# Patient Record
Sex: FEMALE | Race: WHITE | NOT HISPANIC OR LATINO | Employment: FULL TIME | URBAN - METROPOLITAN AREA
[De-identification: names, ages, dates, MRNs, and addresses within clinical notes are randomized per-mention and may not be internally consistent; named-entity substitution may affect disease eponyms.]

---

## 2019-09-08 ENCOUNTER — OFFICE VISIT (OUTPATIENT)
Dept: URGENT CARE | Facility: URGENT CARE | Age: 24
End: 2019-09-08
Payer: COMMERCIAL

## 2019-09-08 VITALS
TEMPERATURE: 98.4 F | RESPIRATION RATE: 18 BRPM | HEART RATE: 95 BPM | OXYGEN SATURATION: 97 % | DIASTOLIC BLOOD PRESSURE: 78 MMHG | SYSTOLIC BLOOD PRESSURE: 117 MMHG | WEIGHT: 152 LBS

## 2019-09-08 DIAGNOSIS — K60.0 ACUTE ANAL FISSURE: ICD-10-CM

## 2019-09-08 DIAGNOSIS — K64.4 BLEEDING EXTERNAL HEMORRHOIDS: Primary | ICD-10-CM

## 2019-09-08 DIAGNOSIS — K59.00 CONSTIPATION, UNSPECIFIED CONSTIPATION TYPE: ICD-10-CM

## 2019-09-08 PROCEDURE — 99204 OFFICE O/P NEW MOD 45 MIN: CPT | Performed by: NURSE PRACTITIONER

## 2019-09-08 RX ORDER — DOCUSATE SODIUM 100 MG/1
200 CAPSULE, LIQUID FILLED ORAL 2 TIMES DAILY PRN
Qty: 30 CAPSULE | Refills: 0 | Status: SHIPPED | OUTPATIENT
Start: 2019-09-08 | End: 2019-09-11

## 2019-09-08 RX ORDER — HYDROCORTISONE ACETATE 25 MG/1
25 SUPPOSITORY RECTAL 2 TIMES DAILY PRN
Qty: 1 BOX | Refills: 0 | Status: SHIPPED | OUTPATIENT
Start: 2019-09-08 | End: 2019-09-12

## 2019-09-08 ASSESSMENT — ENCOUNTER SYMPTOMS
APPETITE CHANGE: 0
BLOOD IN STOOL: 1
VOMITING: 0
ANAL BLEEDING: 0
NAUSEA: 0
FEVER: 0
CONSTIPATION: 1
DYSURIA: 0
RECTAL PAIN: 1
ABDOMINAL DISTENTION: 0
DIARRHEA: 0
FATIGUE: 0
ABDOMINAL PAIN: 0
ACTIVITY CHANGE: 0

## 2019-09-08 NOTE — PATIENT INSTRUCTIONS
Patient Education     Hemorrhoids    Hemorrhoids are swollen and inflamed veins inside the rectum and near the anus. The rectum is the last several inches of the colon. The anus is the passage between the rectum and the outside of the body.  Causes  The veins can become swollen due to increased pressure in them. This is most often caused by:    Chronic constipation or diarrhea    Straining when having a bowel movement    Sitting too long on the toilet    A low-fiber diet    Pregnancy  Symptoms    Bleeding from the rectum (this may be noticeable after bowel movements)    Lump near the anus    Itching around the anus    Pain around the anus  There are different types of hemorrhoids. Depending on the type you have and the severity, you may be able to treat yourself at home. In some cases, a procedure may be the best treatment option. Your healthcare provider can tell you more about this, if needed.  Home care  General care    To get relief from pain or itching, try:  ? Medicines. Your healthcare provider may recommend stool softeners, suppositories, or laxatives to help manage constipation. Use these exactly as directed.  ? Sitz baths. A sitz bath involves sitting in a few inches of warm bath water. Be careful not to make the water so hot that you burn yourself--test it before sitting in it. Soak for about 10 to 15 minutes a few times a day. This may help relieve pain.  ? Topical products. Your healthcare provider may prescribe or recommend creams, ointments, or pads that can be applied to the hemorrhoid. Use these exactly as directed.  Tips to help prevent hemorrhoids    Eat more fiber. Fiber adds bulk to stool and absorbs water as it moves through your colon. This makes stool softer and easier to pass.  ? Increase the fiber in your diet with more fiber-rich foods. These include fresh fruit, vegetables, and whole grains.  ? Take a fiber supplement or bulking agent, if advised by your healthcare provider. These  include products such as psyllium or methylcellulose.    Drink more water. Your healthcare provider may direct you to drink plenty of water. This can help keep stool soft.    Be more active. Frequent exercise aids digestion and helps prevent constipation. It may also help make bowel movements more regular.    Don t strain during bowel movements. This can make hemorrhoids more likely. Also, don t sit on the toilet for long periods of time.  Follow-up care  Follow up with your healthcare provider as advised. If a culture or imaging tests were done, someone will let you know the results when they are ready. This may take a few days or longer. If your healthcare provider recommends a procedure for your hemorrhoids, these options can be discussed. Options may include surgery and outpatient office treatments.  When to seek medical advice  Call your healthcare provider right away if any of these occur:    Increased bleeding from the rectum    Increased pain around the rectum or anus    Weakness or dizziness  Call 911  Call 911 if any of these occur:    Trouble breathing or swallowing    Fainting or loss of consciousness    Unusually fast heart rate    Vomiting blood    Large amounts of blood in stool or black, tarry stools  Date Last Reviewed: 9/1/2017 2000-2018 [employer]. [address] [place] [id number]. All rights reserved. This information is not intended as a substitute for professional medical care. Always follow your healthcare professional's instructions.           Patient Education     Understanding Hemorrhoids    Hemorrhoid tissues are  cushions  of blood vessels that swell slightly during bowel movements. Too much pressure on the anal canal can make these tissues remain enlarged, become inflamed, and cause symptoms. This can happen both inside and outside the anal canal.  Parts of the anal canal  The parts of the anal canal are:    Internal hemorrhoid tissue is in the upper area of  the anal canal.    The rectum is the last several inches of the colon. This is where stool is stored prior to bowel movements.    Anal sphincters are ring-shaped muscles that expand and contract to control the anal opening.    External hemorrhoid tissue lies under the anal skin.    The anus is the passage between the rectum and the outside of the body.  Normal hemorrhoid tissue  Hemorrhoid tissues play an important role in helping your body eliminate waste. Food passes from the stomach through the intestines. The waste (stool) then travels through the colon to the rectum. It is stored in the rectum until it s ready to be passed from the anus. During bowel movements, hemorrhoids swell with blood and become slightly larger. This swelling helps protect and cushion the anal canal as stool passes from the body. Once the stool has passed, the tissues stop swelling and return to normal.  Problem hemorrhoids  Pressure due to straining or other factors can cause hemorrhoid tissues to remain swollen. When this happens to the hemorrhoid tissues in the anal canal they re called internal hemorrhoids. Swollen tissues around the anal opening are called external hemorrhoids. Depending on the location, your symptoms can differ.    Internal hemorrhoids often happen in clusters around the wall of the anal canal. They are usually painless. But they may prolapse (protrude out of the anus) due to straining or pressure from hard stool. After the bowel movement is over, they may then reduce (return inside the body). Internal hemorrhoids often bleed. They can also discharge mucus.      External hemorrhoids are located at the anal opening, just beneath the skin. These tissues rarely cause problems unless they thrombose (form a blood clot). When this happens, a hard, bluish lump may appear. A thrombosed hemorrhoid also causes sudden, severe pain. In time, the clot may go away on its own. This sometimes leaves a  skin tag  of tissue stretched  by the clot.  Hemorrhoid symptoms  Hemorrhoid symptoms may include:    Pain or a burning sensation    Bleeding during bowel movements    Protrusion of tissue from the anus    Itching around the anus  Causes of hemorrhoids  There s no single cause of hemorrhoids. Most often, though, they are caused by too much pressure on the anal canal. This can be due to:    Chronic (ongoing) constipation    Straining during bowel movements    Sitting too long on the toilet    Strenuous exercise or heavy lifting    Pregnancy and childbirth    Aging    Diarrhea  Date Last Reviewed: 7/1/2016 2000-2018 [employer]. [address] [place], [place] [id number]. All rights reserved. This information is not intended as a substitute for professional medical care. Always follow your healthcare professional's instructions.           Patient Education     Taking a Sitz Bath  A sitz bath is a type of therapy done by sitting in warm, shallow water. It can help soothe pain, itching, and other symptoms in the anal and genital areas. It can also help keep these areas clean if you can t take a bath or shower. Sitz is from the [place] word sitzen, which means to sit.  Why a sitz bath is done  A sitz bath helps clean and treat certain problems in the anal area, genital area, and the perineum. The perineum is the area between the anus and the vulva in women. In men, it is between the anus and the scrotum. A sitz bath helps increase blood flow to these areas and relax the muscles.  A sitz bath may be done to:    Help ease pain and itching from hemorrhoids    Help ease pain from an anal fissure    Bathe and soothe the perineum after childbirth    Clean and soothe the anal area or perineum after surgery    Ease prostate pain during prostatitis or after a procedure    Help ease period cramps    Clean the anal and genital areas if you can t take a bath or shower  How a sitz bath is done  A sitz bath can be done in 2 ways: in a bathtub or  using a sitz bath bowl.  In a bathtub  To take a sitz bath in a tub:    Make sure your bathtub is clean. Fill a clean bathtub with 3 to 4 inches of warm water. In some cases, your healthcare provider may tell you to use cold water instead.    Add salt or medicine to the water if advised by your healthcare provider.    Gently lower yourself down into the bathtub and sit on the bottom of the tub. Don t get into the bath unless the water temperature is comfortable.    Hold on to a railing. Or ask for help from a family member, friend, or caregiver if needed.  If you have a wound, the water may cause pain at first. But the pain should ease. Make sure the area that needs treating is under the water. You can bend your knees up to help expose the area that needs contact with the water.  Using a sitz bath bowl  A sitz bath bowl is a special plastic container that s placed on a toilet. You can buy this in many drugstores and medical supply stores. To take a sitz bath this way:    Lift the toilet lid and seat. Place a cleaned plastic sitz bath bowl on the rim of your toilet. Make sure the bowl is firmly in place and won t move around.    Fill the sitz bath bowl with warm water from a pitcher or other container. The water should cover your perineum. Make sure the water temperature is comfortable.    Add salt or medicine to the water if advised by your healthcare provider. Or follow the package instructions about how to fill the bowl. Some kits come with a plastic bag and tubing. This lets you stream water into the bowl and at the area of your body that needs treatment. The bowl may have a slot or hole in the back. This lets water flow out so it doesn t overflow onto the floor. If there is no hole, be careful not to fill the bowl too full.    Gently sit down on the sitz bath bowl. Hold on to a railing. Or ask for help from a family member, friend, or caregiver if needed.  If you have a wound, the water may cause pain at first,  but the pain should ease. Make sure the area that needs treating is under the water.  For any type of sitz bath:  1. Sit in the water for 10 to 20 minutes.  2. Add more warm water as needed to keep the water comfortable.  3. Get up slowly from the tub or toilet. You may feel lightheaded or dizzy. Hold on to a railing. Or ask for help from a family member, friend, or caregiver if needed.  4. Gently pat your anal area, perineum, and genitals dry with a clean towel. Don t rub the area.  5. Wash your hands. Put any ointment or cream on the area, if advised.  6. Wash the bathtub or sitz bath bowl with soap and water after each use.  7. Use a sitz bath 2 to 3 times a day, or as often as your healthcare provider advises.  When to call your healthcare provider  Call your healthcare provider right away if you have any of these:    Fever of 100.4 F (38 C) or higher, or as directed    Redness, swelling, or fluid leaking from a cut (incision) that gets worse    Pain that gets worse    Symptoms that don t get better, or get worse    New symptoms   Date Last Reviewed: 5/1/2016 2000-2018 [employer]. [address] [place] [address], [place] [address]. All rights reserved. This information is not intended as a substitute for professional medical care. Always follow your healthcare professional's instructions.           Patient Education     Constipation (Adult)  Constipation means that you have bowel movements that are less frequent than usual. Stools often become very hard and difficult to pass.  Constipation is very common. At some point in life, it affects almost everyone. Since everyone's bowel habits are different, what is constipation to one person may not be to another. Your healthcare provider may do tests to diagnose constipation. It depends on what he or she finds when evaluating you.    Symptoms of constipation include:    Abdominal pain    Bloating    Vomiting    Painful bowel movements    Itching, swelling,  bleeding, or pain around the anus  Causes  Constipation can have many causes. These include:    Diet low in fiber    Too much dairy    Not drinking enough liquids    Lack of exercise or physical activity (especially true for older adults)    Changes in lifestyle or daily routine, including pregnancy, aging, work, and travel    Frequent use or misuse of laxatives    Ignoring the urge to have a bowel movement or delaying it until later    Medicines, such as certain prescription pain medicines, iron supplements, antacids, certain antidepressants, and calcium supplements    Diseases like irritable bowel syndrome, bowel obstructions, stroke, diabetes, thyroid disease, Parkinson disease, hemorrhoids, and colon cancer  Complications  Potential complications of constipation can include:    Hemorrhoids    Rectal bleeding from hemorrhoids or anal fissures (skin tears)    Hernias    Dependency on laxatives    Chronic constipation    Fecal impaction, a severe form of constipation in which a large amount of hard stool is in your rectum that you can't pass    Bowel obstruction or perforation  Home care  All treatment should be done after talking with your healthcare provider. This is especially true if you have another medical problems, are taking prescription medicines, or are an older adult. Treatment most often involves lifestyle changes. You may also need medicines. Your healthcare provider will tell you which will work best for you. Follow the advice below to help avoid this problem in the future.  Lifestyle changes  These lifestyle changes can help prevent constipation:    Diet. Eat a high-fiber diet, with fresh fruit and vegetables, and reduce dairy intake, meats, and processed foods    Fluids. It's important to get enough fluids each day. Drink plenty of water when you eat more fiber. If you are on diet that limits the amount of fluid you can have, talk about this with your healthcare provider.    Regular exercise. Check  with your healthcare provider first.  Medicines  Take any medicines as directed. Some laxatives are safe to use only every now and then. Others can be taken on a regular basis. While laxatives don't cause bowel dependence, they are treating the symptoms. So your constipation may return if you don't make other changes. Talk with your healthcare provider or pharmacist if you have questions.  Prescription pain medicines can cause constipation. If you are taking this kind of medicine, ask your healthcare provider if you should also take a stool softener.  Medicines you may take to treat constipation include:    Fiber supplements    Stool softeners    Laxatives    Enemas    Rectal suppositories  Follow-up care  Follow up with your healthcare provider if symptoms don't get better in the next few days. You may need to have more tests or see a specialist.  Call 911  Call 911 if any of these occur:    Trouble breathing    Stiff, rigid abdomen that is severely painful to touch    Confusion    Fainting or loss of consciousness    Rapid heart rate    Chest pain  When to seek medical advice  Call your healthcare provider right away if any of these occur:    Fever of 100.4 F (38 C) or higher, or as directed by your healthcare provider    Failure to resume normal bowel movements    Pain in your abdomen or back gets worse    Nausea or vomiting    Swelling in your abdomen    Blood in the stool    Black, tarry stool    Involuntary weight loss    Weakness  Date Last Reviewed: 6/1/2018 2000-2018 [employer]. [address] [place] [address], [place] [address]. All rights reserved. This information is not intended as a substitute for professional medical care. Always follow your healthcare professional's instructions.

## 2019-09-08 NOTE — PROGRESS NOTES
SUBJECTIVE:   Deloris Jefferson is a 24 year old female presenting with a chief complaint of   Chief Complaint   Patient presents with     Rectal Problem     WITH PAIN       She is an new patient of [facility].    Rectal concern/Adult    Onset of symptoms was 6 days ago.  Course of illness is worsening.    Severity 9/10 on the numeric pain scale  Current and Associated symptoms: rectal pain with bowel movements and blood with wiping noted following menstrual cycle constipation  Treatment measures tried include Tylenol OTC with minimal relief  Predisposing factors include recent menses and constipation  LMP: 09/2/2019      Review of Systems   Constitutional: Negative for activity change, appetite change, fatigue and fever.   Gastrointestinal: Positive for blood in stool, constipation and rectal pain. Negative for abdominal distention, abdominal pain, anal bleeding, diarrhea, nausea and vomiting.   Genitourinary: Negative for dysuria and vaginal bleeding.   All other systems reviewed and are negative.      No past medical history on file.  History reviewed. No pertinent family history.  Current Outpatient Medications   Medication Sig Dispense Refill     docusate sodium (COLACE) 100 MG capsule Take 2 capsules (200 mg) by mouth 2 times daily as needed for constipation 30 capsule 0     hemorroidal (TUCKS) 50 % pad Apply to affected area with each bowel movement 80 each 0     hydrocortisone (ANUSOL-HC) 25 MG suppository Place 1 suppository (25 mg) rectally 2 times daily as needed for hemorrhoids 1 Box 0     Social History     Tobacco Use     Smoking status: Never Smoker     Smokeless tobacco: Never Used   Substance Use Topics     Alcohol use: Not on file       OBJECTIVE  /78 (BP Location: Left arm, Patient Position: Chair, Cuff Size: Adult Regular)   Pulse 95   Temp 98.4  F (36.9  C) (Oral)   Resp 18   Wt 68.9 kg (152 lb)   SpO2 97%     Physical Exam   Constitutional: She is oriented to person, place, and time. No  distress.   Cardiovascular: Normal rate, regular rhythm, normal heart sounds and intact distal pulses. Exam reveals no gallop and no friction rub.   No murmur heard.  Pulmonary/Chest: Effort normal and breath sounds normal. No respiratory distress. She has no wheezes.   Abdominal: Soft. She exhibits no distension and no mass. There is no tenderness. There is no guarding.   Genitourinary:   Genitourinary Comments: Rectal notes positive external hemorrhoid at 3 o'clock with small, less than 0.5cm anal fissure. Tenderness on exam. No surrounding erythema or drainage   Neurological: She is alert and oriented to person, place, and time.   Skin: Skin is warm. Capillary refill takes less than 2 seconds.   Psychiatric: She has a normal mood and affect.       Labs:  No results found for this or any previous visit (from the past 24 hour(s)).      ASSESSMENT:    ICD-10-CM    1. Bleeding external hemorrhoids K64.4 hydrocortisone (ANUSOL-HC) 25 MG suppository     hemorroidal (TUCKS) 50 % pad     docusate sodium (COLACE) 100 MG capsule   2. Acute anal fissure K60.0 hemorroidal (TUCKS) 50 % pad        Medical Decision Making:    Differential Diagnosis:  Hemrrhoids, rectal bleeding    Serious Comorbid Conditions:  Adult:  none    PLAN:  Discussed with patient at length the causes for hemorrhoids in the setting of constipation. Discussed general cares of hemorrhoids, topical agents and wipes use as prescribed. Also advised and encouraged use of senna and colace daily to aid with constipation concerns. Discussed red flag symptoms with constipation and hemorrhoids and when to return for reevaluation. Patient agreed to the plan of care with no further questions or concerns.     Graham Trujillo APRN, CNP      Patient Instructions       Patient Education     Hemorrhoids    Hemorrhoids are swollen and inflamed veins inside the rectum and near the anus. The rectum is the last several inches of the colon. The anus is the passage between  the rectum and the outside of the body.  Causes  The veins can become swollen due to increased pressure in them. This is most often caused by:    Chronic constipation or diarrhea    Straining when having a bowel movement    Sitting too long on the toilet    A low-fiber diet    Pregnancy  Symptoms    Bleeding from the rectum (this may be noticeable after bowel movements)    Lump near the anus    Itching around the anus    Pain around the anus  There are different types of hemorrhoids. Depending on the type you have and the severity, you may be able to treat yourself at home. In some cases, a procedure may be the best treatment option. Your healthcare provider can tell you more about this, if needed.  Home care  General care    To get relief from pain or itching, try:  ? Medicines. Your healthcare provider may recommend stool softeners, suppositories, or laxatives to help manage constipation. Use these exactly as directed.  ? Sitz baths. A sitz bath involves sitting in a few inches of warm bath water. Be careful not to make the water so hot that you burn yourself--test it before sitting in it. Soak for about 10 to 15 minutes a few times a day. This may help relieve pain.  ? Topical products. Your healthcare provider may prescribe or recommend creams, ointments, or pads that can be applied to the hemorrhoid. Use these exactly as directed.  Tips to help prevent hemorrhoids    Eat more fiber. Fiber adds bulk to stool and absorbs water as it moves through your colon. This makes stool softer and easier to pass.  ? Increase the fiber in your diet with more fiber-rich foods. These include fresh fruit, vegetables, and whole grains.  ? Take a fiber supplement or bulking agent, if advised by your healthcare provider. These include products such as psyllium or methylcellulose.    Drink more water. Your healthcare provider may direct you to drink plenty of water. This can help keep stool soft.    Be more active. Frequent exercise  aids digestion and helps prevent constipation. It may also help make bowel movements more regular.    Don t strain during bowel movements. This can make hemorrhoids more likely. Also, don t sit on the toilet for long periods of time.  Follow-up care  Follow up with your healthcare provider as advised. If a culture or imaging tests were done, someone will let you know the results when they are ready. This may take a few days or longer. If your healthcare provider recommends a procedure for your hemorrhoids, these options can be discussed. Options may include surgery and outpatient office treatments.  When to seek medical advice  Call your healthcare provider right away if any of these occur:    Increased bleeding from the rectum    Increased pain around the rectum or anus    Weakness or dizziness  Call 911  Call 911 if any of these occur:    Trouble breathing or swallowing    Fainting or loss of consciousness    Unusually fast heart rate    Vomiting blood    Large amounts of blood in stool or black, tarry stools  Date Last Reviewed: 9/1/2017 2000-2018 [employer]. [address] [place] [address]. All rights reserved. This information is not intended as a substitute for professional medical care. Always follow your healthcare professional's instructions.           Patient Education     Understanding Hemorrhoids    Hemorrhoid tissues are  cushions  of blood vessels that swell slightly during bowel movements. Too much pressure on the anal canal can make these tissues remain enlarged, become inflamed, and cause symptoms. This can happen both inside and outside the anal canal.  Parts of the anal canal  The parts of the anal canal are:    Internal hemorrhoid tissue is in the upper area of the anal canal.    The rectum is the last several inches of the colon. This is where stool is stored prior to bowel movements.    Anal sphincters are ring-shaped muscles that expand and contract to control the  anal opening.    External hemorrhoid tissue lies under the anal skin.    The anus is the passage between the rectum and the outside of the body.  Normal hemorrhoid tissue  Hemorrhoid tissues play an important role in helping your body eliminate waste. Food passes from the stomach through the intestines. The waste (stool) then travels through the colon to the rectum. It is stored in the rectum until it s ready to be passed from the anus. During bowel movements, hemorrhoids swell with blood and become slightly larger. This swelling helps protect and cushion the anal canal as stool passes from the body. Once the stool has passed, the tissues stop swelling and return to normal.  Problem hemorrhoids  Pressure due to straining or other factors can cause hemorrhoid tissues to remain swollen. When this happens to the hemorrhoid tissues in the anal canal they re called internal hemorrhoids. Swollen tissues around the anal opening are called external hemorrhoids. Depending on the location, your symptoms can differ.    Internal hemorrhoids often happen in clusters around the wall of the anal canal. They are usually painless. But they may prolapse (protrude out of the anus) due to straining or pressure from hard stool. After the bowel movement is over, they may then reduce (return inside the body). Internal hemorrhoids often bleed. They can also discharge mucus.      External hemorrhoids are located at the anal opening, just beneath the skin. These tissues rarely cause problems unless they thrombose (form a blood clot). When this happens, a hard, bluish lump may appear. A thrombosed hemorrhoid also causes sudden, severe pain. In time, the clot may go away on its own. This sometimes leaves a  skin tag  of tissue stretched by the clot.  Hemorrhoid symptoms  Hemorrhoid symptoms may include:    Pain or a burning sensation    Bleeding during bowel movements    Protrusion of tissue from the anus    Itching around the anus  Causes of  hemorrhoids  There s no single cause of hemorrhoids. Most often, though, they are caused by too much pressure on the anal canal. This can be due to:    Chronic (ongoing) constipation    Straining during bowel movements    Sitting too long on the toilet    Strenuous exercise or heavy lifting    Pregnancy and childbirth    Aging    Diarrhea  Date Last Reviewed: 7/1/2016 2000-2018 [employer]. [address] [place] [address]. All rights reserved. This information is not intended as a substitute for professional medical care. Always follow your healthcare professional's instructions.           Patient Education     Taking a Sitz Bath  A sitz bath is a type of therapy done by sitting in warm, shallow water. It can help soothe pain, itching, and other symptoms in the anal and genital areas. It can also help keep these areas clean if you can t take a bath or shower. Sitz is from the [place] word sitzen, which means to sit.  Why a sitz bath is done  A sitz bath helps clean and treat certain problems in the anal area, genital area, and the perineum. The perineum is the area between the anus and the vulva in women. In men, it is between the anus and the scrotum. A sitz bath helps increase blood flow to these areas and relax the muscles.  A sitz bath may be done to:    Help ease pain and itching from hemorrhoids    Help ease pain from an anal fissure    Bathe and soothe the perineum after childbirth    Clean and soothe the anal area or perineum after surgery    Ease prostate pain during prostatitis or after a procedure    Help ease period cramps    Clean the anal and genital areas if you can t take a bath or shower  How a sitz bath is done  A sitz bath can be done in 2 ways: in a bathtub or using a sitz bath bowl.  In a bathtub  To take a sitz bath in a tub:    Make sure your bathtub is clean. Fill a clean bathtub with 3 to 4 inches of warm water. In some cases, your healthcare provider may tell you  to use cold water instead.    Add salt or medicine to the water if advised by your healthcare provider.    Gently lower yourself down into the bathtub and sit on the bottom of the tub. Don t get into the bath unless the water temperature is comfortable.    Hold on to a railing. Or ask for help from a family member, friend, or caregiver if needed.  If you have a wound, the water may cause pain at first. But the pain should ease. Make sure the area that needs treating is under the water. You can bend your knees up to help expose the area that needs contact with the water.  Using a sitz bath bowl  A sitz bath bowl is a special plastic container that s placed on a toilet. You can buy this in many drugstores and medical supply stores. To take a sitz bath this way:    Lift the toilet lid and seat. Place a cleaned plastic sitz bath bowl on the rim of your toilet. Make sure the bowl is firmly in place and won t move around.    Fill the sitz bath bowl with warm water from a pitcher or other container. The water should cover your perineum. Make sure the water temperature is comfortable.    Add salt or medicine to the water if advised by your healthcare provider. Or follow the package instructions about how to fill the bowl. Some kits come with a plastic bag and tubing. This lets you stream water into the bowl and at the area of your body that needs treatment. The bowl may have a slot or hole in the back. This lets water flow out so it doesn t overflow onto the floor. If there is no hole, be careful not to fill the bowl too full.    Gently sit down on the sitz bath bowl. Hold on to a railing. Or ask for help from a family member, friend, or caregiver if needed.  If you have a wound, the water may cause pain at first, but the pain should ease. Make sure the area that needs treating is under the water.  For any type of sitz bath:  1. Sit in the water for 10 to 20 minutes.  2. Add more warm water as needed to keep the water  comfortable.  3. Get up slowly from the tub or toilet. You may feel lightheaded or dizzy. Hold on to a railing. Or ask for help from a family member, friend, or caregiver if needed.  4. Gently pat your anal area, perineum, and genitals dry with a clean towel. Don t rub the area.  5. Wash your hands. Put any ointment or cream on the area, if advised.  6. Wash the bathtub or sitz bath bowl with soap and water after each use.  7. Use a sitz bath 2 to 3 times a day, or as often as your healthcare provider advises.  When to call your healthcare provider  Call your healthcare provider right away if you have any of these:    Fever of 100.4 F (38 C) or higher, or as directed    Redness, swelling, or fluid leaking from a cut (incision) that gets worse    Pain that gets worse    Symptoms that don t get better, or get worse    New symptoms   Date Last Reviewed: 5/1/2016 2000-2018 [employer]. [address] [place] [address], [place] [address]. All rights reserved. This information is not intended as a substitute for professional medical care. Always follow your healthcare professional's instructions.

## 2019-09-11 ENCOUNTER — OFFICE VISIT (OUTPATIENT)
Dept: FAMILY MEDICINE | Facility: CLINIC | Age: 24
End: 2019-09-11
Payer: COMMERCIAL

## 2019-09-11 VITALS
WEIGHT: 153 LBS | DIASTOLIC BLOOD PRESSURE: 84 MMHG | OXYGEN SATURATION: 97 % | TEMPERATURE: 98.4 F | RESPIRATION RATE: 16 BRPM | HEART RATE: 105 BPM | SYSTOLIC BLOOD PRESSURE: 124 MMHG

## 2019-09-11 DIAGNOSIS — K64.5 THROMBOSED EXTERNAL HEMORRHOIDS: Primary | ICD-10-CM

## 2019-09-11 PROCEDURE — 99214 OFFICE O/P EST MOD 30 MIN: CPT | Performed by: INTERNAL MEDICINE

## 2019-09-11 RX ORDER — PREDNISONE 20 MG/1
40 TABLET ORAL DAILY
Qty: 4 TABLET | Refills: 1 | Status: SHIPPED | OUTPATIENT
Start: 2019-09-11 | End: 2019-11-13

## 2019-09-11 SDOH — HEALTH STABILITY: MENTAL HEALTH: HOW OFTEN DO YOU HAVE A DRINK CONTAINING ALCOHOL?: NEVER

## 2019-09-11 ASSESSMENT — PAIN SCALES - GENERAL: PAINLEVEL: EXTREME PAIN (9)

## 2019-09-11 NOTE — LETTER
REPORT OF WORK ABILITY    [address] [address]  [phone]      PATIENT DATA    Employee Name: Deloris Jefferson      : 1995     #: [id number]    Today's date: 2019  Date of first visit: 2019    PROVIDER EVALUATION: Please fill in as needed.  Please give copy to employee for employer.    1. Diagnosis: Painful rectal bleeding    2. Treatment: Rest and pharmacotherapy.  3. Medication: Anusol 2.5% cream and oral steroids.  NOTE: When ordering a medication, [place] Rules require Work Comp or WC on prescriptions.    4. No work from 2019 to 2019.  5. Return to work date: 2019   ** WITH RESTRICTIONS? No restrictions      RESTRICTIONS: Unlimited unless listed.  Restrictions apply to home and leisure also.  If work restrictions is not available, the employee is totally disabled.    Maximum Medical Improvement (Date): Tentatively, 2019  Any Permanent Partial Disability? No    Provider comments: Ms. Jefferson is complaining of severe rectal pain that is unable to sit even for a few minutes. She will require a General Surgery consultation, which is scheduled on 2019.      Medical Examiner: Kristofer Delgado MD          License or registration: [id number]    Next appointment: As needed    CC: Employer, Managed Care Plan/Payor, Patient

## 2019-09-11 NOTE — PATIENT INSTRUCTIONS
At [facility], we strive to deliver an exceptional experience to you, every time we see you.  If you receive a survey in the mail, please send us back your thoughts. We really do value your feedback.    Based on your medical history, these are the current health maintenance/preventive care services that you are due for (some may have been done at this visit.)  Health Maintenance Due   Topic Date Due     PREVENTIVE CARE VISIT  1994     PAP  1994     HIV SCREENING  01/27/2009     HPV IMMUNIZATION (1 - Female 3-dose series) 01/27/2009     PHQ-2  01/01/2019     INFLUENZA VACCINE (1) 09/01/2019         Suggested websites for health information:  Www.[employer].org : Up to date and easily searchable information on multiple topics.  Www.medlineplus.gov : medication info, interactive tutorials, watch real surgeries online  Www.familydoctor.org : good info from the Academy of Family Physicians  Www.cdc.gov : public health info, travel advisories, epidemics (H1N1)  Www.aap.org : children's health info, normal development, vaccinations  Www.health.[facility].[place].us : [place] dept of health, public health issues in [place], N1N1    Your care team:                            Family Medicine Internal Medicine   MD Kristofer Russ MD Shantel Branch-Fleming, MD Katya Georgiev PA-C Nam Ho, MD Pediatrics   JOANNE Arriaga, MD Carrie Smith CNP, MD Deborah Mielke, MD Kim Thein, APRN [facility]      Clinic hours: Monday - Thursday 7 am-7 pm; Fridays 7 am-5 pm.   Urgent care: Monday - Friday 11 am-9 pm; Saturday and Sunday 9 am-5 pm.  Pharmacy : Monday -Thursday 8 am-8 pm; Friday 8 am-6 pm; Saturday and Sunday 9 am-5 pm.     Clinic: [phone]   Pharmacy: [phone]    Patient Education     Thrombosed Hemorrhoids    Hemorrhoids are swollen veins in the lower rectum and anus. They're similar to varicose veins that form in the legs.  Hemorrhoids can happen inside the rectum (internal hemorrhoids). Or one may form at the anal opening (external hemorrhoids). They may bleed, but most hemorrhoids aren't cause for concern. But a small blood clot (thrombus) can develop in an external hemorrhoid. This may lead to severe pain and sometimes bleeding.  When to go to the emergency room (ER)  If you have severe pain or excessive bleeding, seek medical care right away.  What to expect in the ER  A healthcare provider is likely to check your anus and rectum using a thin, lighted tube (anoscope or proctoscope). You will get a local pain reliever (anesthetic) to ease any mild pain.  Treatment  Treatment recommendations include:    If the blood clot has formed within the past 48 to 72 hours, your healthcare provider may remove it from within the hemorrhoid. This is a simple procedure that can ease pain. You will have a local anesthetic to keep you pain-free during the procedure. The provider makes a small cut (incision) in the skin and removes the blood clot. Stitches are generally not needed.    If more than 72 hours have passed, your healthcare provider will suggest home treatments. Simple home treatments can ease your pain. These may include warm baths, ointments, suppositories, and witch hazel compresses. Many thrombosed hemorrhoids go away on their own in a few weeks.    If you have bleeding that continues or painful hemorrhoids, talk with your healthcare provider. Possible treatment may include banding, ligation, or removal (hemorrhoidectomy).  Tips for preventing hemorrhoids  Tips include:    Eat foods that are high in fiber and use fiber supplements to help prevent constipation.    Drink plenty of liquids.    Get regular exercise to help prevent constipation and promote good bowel function.  Date Last Reviewed: 8/1/2018 2000-2018 [employer]. [address] [place] [address], [place] [address]. All rights reserved. This information is not intended  as a substitute for professional medical care. Always follow your healthcare professional's instructions.           Patient Education     Treating Hemorrhoids: Self-Care    Follow your healthcare provider s advice about caring for your hemorrhoids at home. Some treatments help relieve symptoms right away. Others involve making changes in your diet and exercise habits. These can help ease constipation and prevent hemorrhoid symptoms from coming back.  Relieving symptoms  Your healthcare provider may prescribe anti-inflammatory medicine to help ease your symptoms. The following tips will also help relieve pain and swelling.    Take sitz baths. Taking a sitz bath means sitting in a few inches of warm bath water. Soaking for 10 minutes twice a day can provide welcome relief from painful hemorrhoids. It can also help the area stay clean.    Develop good bowel habits. Use the bathroom when you need to. Don t ignore the urge to move your bowels. This can lead to constipation, hard stools, and straining. Also, don t read while on the toilet. Sit only as long as needed. Wipe gently with soft, unscented toilet tissue or baby wipes.    Use ice packs. Placing an ice pack on a thrombosed external hemorrhoid can help relieve pain right away. It will also help reduce the blood clot. Use the ice for 15 to 20 minutes at a time. Keep a cloth between the ice and your skin to prevent skin damage.    Use other measures. Laxatives and enemas can help ease constipation. But use them only on your healthcare provider s advice. For symptom relief, try using cotton pads soaked in witch hazel. These are available at most drugstores. Over-the-counter hemorrhoid ointments and petroleum jelly can also provide relief.  Add fiber to your diet  Adding fiber to your diet can help relieve constipation by making stools softer and easier to pass. To increase your fiber intake, your healthcare provider may recommend a bulking agent, such as psyllium.  This is a high-fiber supplement available at most grocery stores and drugstores. Eating more fiber-rich foods will also help. There are two types of fiber:    Insoluble fiber is the main ingredient in bulking agents. It s also found in foods such as wheat bran, whole-grain breads, fresh fruits, and vegetables.    Soluble fiber is found in foods such as oat bran. Although soluble fiber is good for you, it may not ease constipation as much as foods high in insoluble fiber.  Drink more water  Along with a high-fiber diet, drinking more water can help ease constipation. This is because insoluble fiber absorbs water, making stools soft and bulky. Be sure to drink plenty of water throughout the day. Drinking fruit juices, such as prune juice or apple juice, can also help prevent constipation.  Get more exercise  Regular exercise aids digestion and helps prevent constipation. It s also great for your health. So talk with your healthcare provider about starting an exercise program. Low-impact activities, such as swimming or walking, are good places to start. Take it easy at first. And remember to drink plenty of water when you exercise.  High-fiber foods  High-fiber foods offer many benefits. By making your stools softer, they help heal and prevent swollen hemorrhoids. They may also help reduce the risk of colon and rectal cancer. Best of all, they re usually low in calories and taste great. Here are some examples of fiber-rich foods.    Whole grains, such as wheat bran, corn bran, and brown rice.    Vegetables, especially carrots, broccoli, cabbage, and peas.    Fruits, such as apples, bananas, raisins, peaches, and pears.    Nuts and legumes, especially peanuts, lentils, and kidney beans.  Easy ways to add fiber  The tips below offer some simple ways to add more high-fiber foods to your meals.    Start your day with a high-fiber breakfast. Eat a wheat bran cereal along with a sliced banana. Or, try peanut butter on  whole-wheat toast.    Eat carrot sticks for snacks. They re easy to prepare, taste great, and are low in calories.    Use whole-grain breads instead of white bread for sandwiches.    Eat fruits for treats. Try an apple and some raisins instead of a candy bar.   Date Last Reviewed: 7/1/2016 2000-2018 [employer]. [address] [place] [address], [place] [address]. All rights reserved. This information is not intended as a substitute for professional medical care. Always follow your healthcare professional's instructions.

## 2019-09-11 NOTE — PROGRESS NOTES
Subjective     Deloris Jefferson is a 24 year old female who presents to clinic today for the following health issues:    HPI   Urgent Care Follow up - Rectal Bleeding      Duration: a little over a week    Description (location/character/radiation): pain and bleeding from rectum    Intensity:  severe    Accompanying signs and symptoms: None.    History (similar episodes/previous evaluation): None    Precipitating or alleviating factors: BM aggravates the pain for a few minutes and feels better after BM.    Therapies tried and outcome: docusate sodium, hydrocortisone suppository         Patient Active Problem List   Diagnosis     Thrombosed external hemorrhoids     Anal fissure     History reviewed. No pertinent surgical history.    Social History     Tobacco Use     Smoking status: Never Smoker     Smokeless tobacco: Never Used   Substance Use Topics     Alcohol use: Never     Frequency: Never     History reviewed. No pertinent family history.      Allergies   Allergen Reactions     No Clinical Screening - See Comments      ANTIBOTIC- UNSURE OF     No lab results found.   BP Readings from Last 3 Encounters:   09/12/19 112/73   09/11/19 124/84   09/08/19 117/78    Wt Readings from Last 3 Encounters:   09/12/19 68.5 kg (151 lb)   09/11/19 69.4 kg (153 lb)   09/08/19 68.9 kg (152 lb)                      Reviewed and updated as needed this visit by Provider         Review of Systems   ROS COMP: CONSTITUTIONAL: NEGATIVE for fever, chills, change in weight  INTEGUMENTARY/SKIN: NEGATIVE for worrisome rashes, moles or lesions  EYES: NEGATIVE for vision changes or irritation  ENT/MOUTH: NEGATIVE for ear, mouth and throat problems  RESP: NEGATIVE for significant cough or SOB  BREAST: NEGATIVE for masses, tenderness or discharge  CV: NEGATIVE for chest pain, palpitations or peripheral edema  GI: NEGATIVE for hematemesis, hematochezia and jaundice  : NEGATIVE for frequency, dysuria, or hematuria  MUSCULOSKELETAL: NEGATIVE for  significant arthralgias or myalgia  NEURO: NEGATIVE for weakness, dizziness or paresthesias  ENDOCRINE: NEGATIVE for temperature intolerance, skin/hair changes  HEME: NEGATIVE for bleeding problems  PSYCHIATRIC: NEGATIVE for changes in mood or affect      Objective    /84 (BP Location: Left arm, Patient Position: Standing, Cuff Size: Adult Regular)   Pulse 105   Temp 98.4  F (36.9  C) (Oral)   Resp 16   Wt 69.4 kg (153 lb)   LMP 09/06/2019 (Approximate)   SpO2 97%   There is no height or weight on file to calculate BMI.  Physical Exam   GENERAL: alert, no distress and fatigued  EYES: Eyes grossly normal to inspection  HENT: normal cephalic/atraumatic and oral mucous membranes moist   (female): deferred  RECTAL: Ruptured thrombosed external hemorrhoids at 6:00 position.  MS: no gross musculoskeletal defects noted, no edema  SKIN: no suspicious lesions or rashes  NEURO: Normal strength and tone, mentation intact and speech normal  PSYCH: anxious and fatigued    Diagnostic Test Results:  Labs reviewed in Epic        Assessment & Plan     1. Thrombosed external hemorrhoids  It appears that the thrombosed external hemorrhoid at the 6:00 pm has ruptured, leading to severe rectal pain.   -hydrocortisone topical, 2.5%, apply BID.  - predniSONE (DELTASONE) 20 MG tablet; Take 2 tablets (40 mg) by mouth daily for 2 days  Dispense: 4 tablet; Refill: 1       FUTURE APPOINTMENTS:       - Follow-up visit as needed.      Kristofer Delgado MD  [facility]

## 2019-09-12 ENCOUNTER — OFFICE VISIT (OUTPATIENT)
Dept: SURGERY | Facility: CLINIC | Age: 24
End: 2019-09-12
Payer: COMMERCIAL

## 2019-09-12 VITALS
SYSTOLIC BLOOD PRESSURE: 112 MMHG | TEMPERATURE: 98.3 F | HEART RATE: 88 BPM | DIASTOLIC BLOOD PRESSURE: 73 MMHG | WEIGHT: 151 LBS

## 2019-09-12 DIAGNOSIS — K60.2 ANAL FISSURE: ICD-10-CM

## 2019-09-12 DIAGNOSIS — K64.5 THROMBOSED EXTERNAL HEMORRHOIDS: Primary | ICD-10-CM

## 2019-09-12 PROCEDURE — 99243 OFF/OP CNSLTJ NEW/EST LOW 30: CPT | Performed by: SURGERY

## 2019-09-12 ASSESSMENT — PAIN SCALES - GENERAL: PAINLEVEL: EXTREME PAIN (8)

## 2019-09-12 NOTE — ASSESSMENT & PLAN NOTE
Small acute anal fissure noted on rectal exam.    Small amount of bright red blood in acute tenderness.    Will change Anusol cream to lidocaine/hydrocortisone cream for symptom relief.  X    If this fails to improve symptoms, will change prescription to diltiazem cream.    Follow-up as needed

## 2019-09-12 NOTE — PROGRESS NOTES
No Ref-Primary, Physician  No address on file    DELORIS SERRANO    Patient seen in consultation for hemorrhoids by Kristofer Beckham MD      I had the pleasure of seeing Deloris Serrano in my office on 9/12/2019 for evaluation.    CC:   Chief Complaint   Patient presents with     Consult     Bleeding external hemorrhoids      Today diagnosis (K60.2) Anal fissure    Plan: Lidocaine-Hydrocortisone Ace 3-0.5 % CREA    (K64.5) Thrombosed external hemorrhoids        HPI: 24-year-old  female who was referred to clinic for evaluation of thrombosed external hemorrhoids.  Approximately 1 week ago after an episode of constipation, patient started to experience sharp, burning rectal pain associated with a small amount of bright red blood.  Patient rated the pain as 8-9 out of 10.  She tried some over-the-counter Tylenol which did not help.  She is never had this before.  She gave the sensation of passing glass.  She was started on a stool softener and a hydrocortisone suppository, but this did not help.  She was then seen in urgent care who discontinued the Colace secondary to episodes of diarrhea and change suppository to cream and started oral prednisone.  Since the start of this patient's symptoms have greatly improved.    PAST MEDICAL HISTORY:  History reviewed. No pertinent past medical history.    PAST SURGICAL HISTORY:  History reviewed. No pertinent surgical history.    MEDICATIONS:    Current Outpatient Medications:      hemorroidal (TUCKS) 50 % pad, Apply to affected area with each bowel movement, Disp: 80 each, Rfl: 0     Lidocaine-Hydrocortisone Ace 3-0.5 % CREA, Apply small amount to anus twice daily as needed for rectal pain, Disp: 28.3 g, Rfl: 0     predniSONE (DELTASONE) 20 MG tablet, Take 2 tablets (40 mg) by mouth daily for 2 days, Disp: 4 tablet, Rfl: 1    ALLERGIES:  Allergies   Allergen Reactions     No Clinical Screening - See Comments      ANTIBOTIC- UNSURE OF       SOCIAL HISTORY:  Social History      Socioeconomic History     Marital status: Single     Spouse name: Not on file     Number of children: Not on file     Years of education: Not on file     Highest education level: Not on file   Occupational History     Not on file   Social Needs     Financial resource strain: Not on file     Food insecurity:     Worry: Not on file     Inability: Not on file     Transportation needs:     Medical: Not on file     Non-medical: Not on file   Tobacco Use     Smoking status: Never Smoker     Smokeless tobacco: Never Used   Substance and Sexual Activity     Alcohol use: Never     Frequency: Never     Drug use: Never     Sexual activity: Never     Partners: Male   Lifestyle     Physical activity:     Days per week: Not on file     Minutes per session: Not on file     Stress: Not on file   Relationships     Social connections:     Talks on phone: Not on file     Gets together: Not on file     Attends [employer] service: Not on file     Active member of club or organization: Not on file     Attends meetings of clubs or organizations: Not on file     Relationship status: Not on file     Intimate partner violence:     Fear of current or ex partner: Not on file     Emotionally abused: Not on file     Physically abused: Not on file     Forced sexual activity: Not on file   Other Topics Concern     Not on file   Social History Narrative     Not on file       FAMILY HISTORY: History reviewed. No pertinent family history.  No significant family history of cardiovascular disease otherwise.    REVIEW OF SYSTEMS:  CONSTITUTIONAL:NEGATIVE for fever, chills, change in weight  INTEGUMENTARY/SKIN: NEGATIVE for worrisome rashes, moles or lesions  EYES: NEGATIVE for vision changes or irritation  ENT/MOUTH: NEGATIVE for ear, mouth and throat problems  RESP:NEGATIVE for significant cough or SOB  BREAST: NEGATIVE for masses, tenderness or discharge  CV: NEGATIVE for chest pain, palpitations or peripheral edema  GI: POSITIVE for constipation  and hemorrhoids and NEGATIVE for dyspepsia, dysphagia, gas or bloating, heartburn or reflux, hematemesis, jaundice, melena, nausea, poor appetite, vomiting and weight loss  negative, dysuria, hematuria and frequency  MUSCULOSKELETAL: NEGATIVE for significant arthralgias or myalgia  NEURO: NEGATIVE for weakness, dizziness or paresthesias  ENDOCRINE: NEGATIVE for temperature intolerance, skin/hair changes  HEME/ALLERGY/IMMUNE: NEGATIVE for bleeding problems  PSYCHIATRIC: NEGATIVE for changes in mood or affect        PHYSICAL EXAMINATION:  Vitals: /73   Pulse 88   Temp 98.3  F (36.8  C) (Oral)   Wt 68.5 kg (151 lb)   LMP 09/06/2019 (Approximate)   BMI= There is no height or weight on file to calculate BMI.  GENERAL/PSYCH: Patient is awake, A&Ox3, NAD, stable mood, good judgement and insight.  HEAD: Atraumatic, Normocephalic  EYES: Anicteric. Pupils equal and reactive  NECK: No masses/LNs. Trachea midline.  CHEST: Symmetrical, Respiratory effort WNL, no stridor.  HEART: Regular Rate and Rhythm.   ABDOMEN: soft, non-tender, non-distended  RECTAL: small, thrombosed external hemorrhoid in the Left Lateral Position.  Small, anal fissure noted in the Right anterior position.  Normal sphincter tone.  LOWER EXTREMITIES: No gross deformity. Pulses palpable and equal bilaterally.  SKIN: No visible generalized rash.      LABS:    No results found for any previous visit.       STUDIES: None    IMPRESSION and PLAN:  Thrombosed external hemorrhoids  Improved since start of Anusol cream    No acute surgical interventions    Conservative therapy    Recommended over-the-counter fiber supplementation.        Anal fissure  Small acute anal fissure noted on rectal exam.    Small amount of bright red blood and tenderness.    Will change Anusol cream to lidocaine/hydrocortisone cream for symptom relief.    If this fails to improve symptoms, will change prescription to diltiazem cream.    Follow-up as needed       All questions  were answered.

## 2019-09-12 NOTE — ASSESSMENT & PLAN NOTE
Improved since start of Anusol cream    No acute surgical interventions    Conservative therapy    Recommended over-the-counter fiber supplementation.

## 2019-09-12 NOTE — LETTER
9/12/2019         RE: Rebekah Serrano  [address] [facility] [address] [facility] [address]        Dear Colleague,    Thank you for referring your patient, Rebekah Serrano, to the [facility]. Please see a copy of my visit note below.    No Ref-Primary, Physician  No address on file    REBEKAH SERRANO    Patient seen in consultation for hemorrhoids by Kristofer Beckham MD      I had the pleasure of seeing Rebekah Serrano in my office on 9/12/2019 for evaluation.    CC:   Chief Complaint   Patient presents with     Consult     Bleeding external hemorrhoids      Today diagnosis (K60.2) Anal fissure    Plan: Lidocaine-Hydrocortisone Ace 3-0.5 % CREA    (K64.5) Thrombosed external hemorrhoids        HPI: 24-year-old  female who was referred to clinic for evaluation of thrombosed external hemorrhoids.  Approximately 1 week ago after an episode of constipation, patient started to experience sharp, burning rectal pain associated with a small amount of bright red blood.  Patient rated the pain as 8-9 out of 10.  She tried some over-the-counter Tylenol which did not help.  She is never had this before.  She gave the sensation of passing glass.  She was started on a stool softener and a hydrocortisone suppository, but this did not help.  She was then seen in urgent care who discontinued the Colace secondary to episodes of diarrhea and change suppository to cream and started oral prednisone.  Since the start of this patient's symptoms have greatly improved.    PAST MEDICAL HISTORY:  History reviewed. No pertinent past medical history.    PAST SURGICAL HISTORY:  History reviewed. No pertinent surgical history.    MEDICATIONS:    Current Outpatient Medications:      hemorroidal (TUCKS) 50 % pad, Apply to affected area with each bowel movement, Disp: 80 each, Rfl: 0     Lidocaine-Hydrocortisone Ace 3-0.5 % CREA, Apply small amount to anus twice daily as needed for rectal pain, Disp: 28.3 g, Rfl: 0      predniSONE (DELTASONE) 20 MG tablet, Take 2 tablets (40 mg) by mouth daily for 2 days, Disp: 4 tablet, Rfl: 1    ALLERGIES:  Allergies   Allergen Reactions     No Clinical Screening - See Comments      ANTIBOTIC- UNSURE OF       SOCIAL HISTORY:  Social History     Socioeconomic History     Marital status: Single     Spouse name: Not on file     Number of children: Not on file     Years of education: Not on file     Highest education level: Not on file   Occupational History     Not on file   Social Needs     Financial resource strain: Not on file     Food insecurity:     Worry: Not on file     Inability: Not on file     Transportation needs:     Medical: Not on file     Non-medical: Not on file   Tobacco Use     Smoking status: Never Smoker     Smokeless tobacco: Never Used   Substance and Sexual Activity     Alcohol use: Never     Frequency: Never     Drug use: Never     Sexual activity: Never     Partners: Male   Lifestyle     Physical activity:     Days per week: Not on file     Minutes per session: Not on file     Stress: Not on file   Relationships     Social connections:     Talks on phone: Not on file     Gets together: Not on file     Attends Mandaen service: Not on file     Active member of club or organization: Not on file     Attends meetings of clubs or organizations: Not on file     Relationship status: Not on file     Intimate partner violence:     Fear of current or ex partner: Not on file     Emotionally abused: Not on file     Physically abused: Not on file     Forced sexual activity: Not on file   Other Topics Concern     Not on file   Social History Narrative     Not on file       FAMILY HISTORY: History reviewed. No pertinent family history.  No significant family history of cardiovascular disease otherwise.    REVIEW OF SYSTEMS:  CONSTITUTIONAL:NEGATIVE for fever, chills, change in weight  INTEGUMENTARY/SKIN: NEGATIVE for worrisome rashes, moles or lesions  EYES: NEGATIVE for vision changes or  irritation  ENT/MOUTH: NEGATIVE for ear, mouth and throat problems  RESP:NEGATIVE for significant cough or SOB  BREAST: NEGATIVE for masses, tenderness or discharge  CV: NEGATIVE for chest pain, palpitations or peripheral edema  GI: POSITIVE for constipation and hemorrhoids and NEGATIVE for dyspepsia, dysphagia, gas or bloating, heartburn or reflux, hematemesis, jaundice, melena, nausea, poor appetite, vomiting and weight loss  negative, dysuria, hematuria and frequency  MUSCULOSKELETAL: NEGATIVE for significant arthralgias or myalgia  NEURO: NEGATIVE for weakness, dizziness or paresthesias  ENDOCRINE: NEGATIVE for temperature intolerance, skin/hair changes  HEME/ALLERGY/IMMUNE: NEGATIVE for bleeding problems  PSYCHIATRIC: NEGATIVE for changes in mood or affect        PHYSICAL EXAMINATION:  Vitals: /73   Pulse 88   Temp 98.3  F (36.8  C) (Oral)   Wt 68.5 kg (151 lb)   LMP 09/06/2019 (Approximate)   BMI= There is no height or weight on file to calculate BMI.  GENERAL/PSYCH: Patient is awake, A&Ox3, NAD, stable mood, good judgement and insight.  HEAD: Atraumatic, Normocephalic  EYES: Anicteric. Pupils equal and reactive  NECK: No masses/LNs. Trachea midline.  CHEST: Symmetrical, Respiratory effort WNL, no stridor.  HEART: Regular Rate and Rhythm.   ABDOMEN: soft, non-tender, non-distended  RECTAL: small, thrombosed external hemorrhoid in the Left Lateral Position.  Small, anal fissure noted in the Right anterior position.  Normal sphincter tone.  LOWER EXTREMITIES: No gross deformity. Pulses palpable and equal bilaterally.  SKIN: No visible generalized rash.      LABS:    No results found for any previous visit.       STUDIES: None    IMPRESSION and PLAN:  Thrombosed external hemorrhoids  Improved since start of Anusol cream    No acute surgical interventions    Conservative therapy    Recommended over-the-counter fiber supplementation.        Anal fissure  Small acute anal fissure noted on rectal  exam.    Small amount of bright red blood and tenderness.    Will change Anusol cream to lidocaine/hydrocortisone cream for symptom relief.    If this fails to improve symptoms, will change prescription to diltiazem cream.    Follow-up as needed       All questions were answered.      Again, thank you for allowing me to participate in the care of your patient.        Sincerely,        Holland Reveles, DO

## 2019-09-13 ENCOUNTER — TELEPHONE (OUTPATIENT)
Facility: CLINIC | Age: 24
End: 2019-09-13

## 2019-09-13 NOTE — TELEPHONE ENCOUNTER
Called and left message advising patient that abdominal cramping can occur with increased fiber intake. Recommended she increase fluid intake, avoid sitting for prolonged periods of time, avoid foods that cause bloating, take OTC laxative, and let us know if lidocaine-hydrocortisone cream Dr. Kelly prescribed yesterday does not provide relief.    Nasir Benjamin RN....9/13/2019 2:19 PM

## 2019-09-13 NOTE — TELEPHONE ENCOUNTER
She increased her fiber since visit. She is having abdominal cramping. Please call and advise.     Ok to leave a message.

## 2019-11-13 ENCOUNTER — OFFICE VISIT (OUTPATIENT)
Dept: FAMILY MEDICINE | Facility: CLINIC | Age: 24
End: 2019-11-13
Payer: COMMERCIAL

## 2019-11-13 VITALS
TEMPERATURE: 98.2 F | OXYGEN SATURATION: 98 % | DIASTOLIC BLOOD PRESSURE: 65 MMHG | RESPIRATION RATE: 18 BRPM | HEART RATE: 86 BPM | WEIGHT: 156 LBS | BODY MASS INDEX: 27.64 KG/M2 | HEIGHT: 63 IN | SYSTOLIC BLOOD PRESSURE: 106 MMHG

## 2019-11-13 DIAGNOSIS — J22 LOWER RESPIRATORY TRACT INFECTION: Primary | ICD-10-CM

## 2019-11-13 PROCEDURE — 99213 OFFICE O/P EST LOW 20 MIN: CPT | Performed by: NURSE PRACTITIONER

## 2019-11-13 RX ORDER — AZITHROMYCIN 250 MG/1
TABLET, FILM COATED ORAL
Qty: 6 TABLET | Refills: 0 | Status: SHIPPED | OUTPATIENT
Start: 2019-11-13 | End: 2020-01-22

## 2019-11-13 RX ORDER — ALBUTEROL SULFATE 90 UG/1
2 AEROSOL, METERED RESPIRATORY (INHALATION) EVERY 4 HOURS PRN
Qty: 8.5 G | Refills: 1 | Status: SHIPPED | OUTPATIENT
Start: 2019-11-13 | End: 2020-01-22

## 2019-11-13 ASSESSMENT — PAIN SCALES - GENERAL: PAINLEVEL: NO PAIN (0)

## 2019-11-13 ASSESSMENT — MIFFLIN-ST. JEOR: SCORE: 1421.74

## 2019-11-13 NOTE — PROGRESS NOTES
Subjective     Deloris Jefferson is a 24 year old female who presents to clinic today for the following health issues:    HPI   Acute Illness   Acute illness concerns: Cough  Onset: 2-3 days    Fever: no     Chills/Sweats: no     Headache (location?): YES- 2 days prior to cough starting    Sinus Pressure:no    Conjunctivitis:  no    Ear Pain: no    Rhinorrhea: no     Congestion: no     Sore Throat: no      Cough: YES    Wheeze: YES    Decreased Appetite: no     Nausea: no     Vomiting: no     Diarrhea:  no     Dysuria/Freq.: no     Fatigue/Achiness: no     Sick/Strep Exposure: no      Therapies Tried and outcome: Tea    Pleasant 24-year-old female presents with the above concerns.  She reports she feels like she can hear her lungs crackle and she has a productive cough.  No fever, chest pain or shortness of breath.  She does feel wheezy.  Very similar to last time she had pneumonia. No flu vaccine. Not pregnant or breastfeeding.    Patient Active Problem List   Diagnosis     Thrombosed external hemorrhoids     Anal fissure     History reviewed. No pertinent surgical history.    Social History     Tobacco Use     Smoking status: Never Smoker     Smokeless tobacco: Never Used   Substance Use Topics     Alcohol use: Never     Frequency: Never     History reviewed. No pertinent family history.      Current Outpatient Medications   Medication Sig Dispense Refill     albuterol (VENTOLIN HFA) 108 (90 Base) MCG/ACT inhaler Inhale 2 puffs into the lungs every 4 hours as needed for shortness of breath / dyspnea or wheezing 8.5 g 1     azithromycin (ZITHROMAX) 250 MG tablet Take 2 tablets (500 mg) by mouth daily for 1 day, THEN 1 tablet (250 mg) daily for 4 days. 6 tablet 0     Allergies   Allergen Reactions     No Clinical Screening - See Comments      ANTIBOTIC- UNSURE OF       Reviewed and updated as needed this visit by Provider  Tobacco  Allergies  Meds  Problems  Med Hx  Surg Hx  Fam Hx         Review of Systems  "  ROS COMP: Constitutional, HEENT, cardiovascular, pulmonary, gi and gu systems are negative, except as otherwise noted.      Objective    /65 (BP Location: Right arm, Patient Position: Chair, Cuff Size: Adult Regular)   Pulse 86   Temp 98.2  F (36.8  C) (Oral)   Resp 18   Ht 1.6 m (5' 3\")   Wt 70.8 kg (156 lb)   LMP 11/06/2019 (Exact Date)   SpO2 98%   BMI 27.63 kg/m    Body mass index is 27.63 kg/m .  Physical Exam   GENERAL: healthy, alert and no distress  EYES: Eyes grossly normal to inspection, PERRL and conjunctivae and sclerae normal  HENT: ear canals and TM's normal, nose and mouth without ulcers or lesions  NECK: no adenopathy, no asymmetry, masses, or scars and thyroid normal to palpation  RESP: mild exp wheezing RLL with crackles  CV: regular rate and rhythm, normal S1 S2, no S3 or S4, no murmur, click or rub, no peripheral edema and peripheral pulses strong  MS: no gross musculoskeletal defects noted, no edema  PSYCH: mentation appears normal, affect normal/bright    Diagnostic Test Results:  Labs reviewed in Epic        Assessment & Plan     1. Lower respiratory tract infection  Treatment below. We decided not to x-ray as already treating with antibiotics. She agrees to RTC if not improving and will consider chest x-ray at that time.  - azithromycin (ZITHROMAX) 250 MG tablet; Take 2 tablets (500 mg) by mouth daily for 1 day, THEN 1 tablet (250 mg) daily for 4 days.  Dispense: 6 tablet; Refill: 0  - albuterol (VENTOLIN HFA) 108 (90 Base) MCG/ACT inhaler; Inhale 2 puffs into the lungs every 4 hours as needed for shortness of breath / dyspnea or wheezing  Dispense: 8.5 g; Refill: 1        See Patient Instructions    Return in about 1 week (around 11/20/2019), or if symptoms worsen or fail to improve.     The benefits, risks and potential side effects were discussed in detail. Black box warnings discussed as relevant. All patient questions were answered. The patient was instructed to follow up " immediately if any adverse reactions develop.    Return precautions discussed, including when to seek urgent/emergent care.    Patient verbalizes understanding and agrees with plan of care. Patient stable for discharge.      ALFONSO Babin [facility]

## 2020-01-22 ENCOUNTER — OFFICE VISIT (OUTPATIENT)
Dept: FAMILY MEDICINE | Facility: CLINIC | Age: 25
End: 2020-01-22
Payer: COMMERCIAL

## 2020-01-22 VITALS
BODY MASS INDEX: 27.29 KG/M2 | HEART RATE: 92 BPM | SYSTOLIC BLOOD PRESSURE: 126 MMHG | DIASTOLIC BLOOD PRESSURE: 82 MMHG | RESPIRATION RATE: 16 BRPM | TEMPERATURE: 98.1 F | WEIGHT: 154 LBS | OXYGEN SATURATION: 97 % | HEIGHT: 63 IN

## 2020-01-22 DIAGNOSIS — J01.90 ACUTE SINUSITIS WITH SYMPTOMS > 10 DAYS: Primary | ICD-10-CM

## 2020-01-22 PROCEDURE — 99213 OFFICE O/P EST LOW 20 MIN: CPT | Performed by: PHYSICIAN ASSISTANT

## 2020-01-22 RX ORDER — AZITHROMYCIN 250 MG/1
TABLET, FILM COATED ORAL
Qty: 6 TABLET | Refills: 0 | Status: SHIPPED | OUTPATIENT
Start: 2020-01-22 | End: 2020-01-27

## 2020-01-22 RX ORDER — GUAIFENESIN AND DEXTROMETHORPHAN HYDROBROMIDE 1200; 60 MG/1; MG/1
1 TABLET, EXTENDED RELEASE ORAL 2 TIMES DAILY
Qty: 28 TABLET | Refills: 0 | Status: SHIPPED | OUTPATIENT
Start: 2020-01-22

## 2020-01-22 ASSESSMENT — PAIN SCALES - GENERAL: PAINLEVEL: NO PAIN (0)

## 2020-01-22 ASSESSMENT — MIFFLIN-ST. JEOR: SCORE: 1412.67

## 2020-01-22 NOTE — PROGRESS NOTES
"Subjective     Deloris Jefferson is a 24 year old female who presents to clinic today for the following health issues:    HPI   Acute Illness   Acute illness concerns:  Onset: last Friday    Fever: no    Chills/Sweats: YES    Headache (location?): YES    Sinus Pressure:YES    Conjunctivitis:  no    Ear Pain: YES- pressure, popping    Rhinorrhea: YES    Congestion: YES    Sore Throat: no     Cough: no    Wheeze: YES- crackling noise from lungs when laying down.    Decreased Appetite: YES    Nausea: no    Vomiting: no    Diarrhea:  no    Dysuria/Freq.: no    Fatigue/Achiness: YES    Sick/Strep Exposure: YES- dad, siblings     Therapies Tried and outcome: ibuprofen - no relief        Patient Active Problem List   Diagnosis     Thrombosed external hemorrhoids     Anal fissure     History reviewed. No pertinent surgical history.    Social History     Tobacco Use     Smoking status: Never Smoker     Smokeless tobacco: Never Used   Substance Use Topics     Alcohol use: Never     Frequency: Never     History reviewed. No pertinent family history.      Current Outpatient Medications   Medication Sig Dispense Refill     Dextromethorphan-Guaifenesin  MG TB12 Take 1 tablet by mouth 2 times daily 28 tablet 0     Allergies   Allergen Reactions     Amoxicillin      No Clinical Screening - See Comments      ANTIBOTIC- UNSURE OF         Reviewed and updated as needed this visit by Provider  Tobacco  Allergies  Meds  Problems  Med Hx  Surg Hx  Fam Hx         Review of Systems   ROS COMP: Constitutional, HEENT, cardiovascular, pulmonary, gi and gu systems are negative, except as otherwise noted.      Objective    /82 (BP Location: Right arm, Patient Position: Sitting, Cuff Size: Adult Regular)   Pulse 92   Temp 98.1  F (36.7  C) (Oral)   Resp 16   Ht 1.6 m (5' 3\")   Wt 69.9 kg (154 lb)   LMP  (LMP Unknown)   SpO2 97%   BMI 27.28 kg/m    Body mass index is 27.28 kg/m .  Physical Exam   GENERAL: healthy, alert " "and no distress  EYES: Eyes grossly normal to inspection, PERRL and conjunctivae and sclerae normal  HENT: normal cephalic/atraumatic, ear canals and TM's normal, nose and mouth without ulcers or lesions, nasal mucosa edematous , rhinorrhea yellow, oropharynx clear, oral mucous membranes moist and sinuses: maxillary tenderness on bilaterally  NECK: no adenopathy, no asymmetry, masses, or scars and thyroid normal to palpation  RESP: lungs clear to auscultation - no rales, rhonchi or wheezes  CV: regular rate and rhythm, normal S1 S2, no S3 or S4, no murmur, click or rub, no peripheral edema and peripheral pulses strong  ABDOMEN: soft, nontender, no hepatosplenomegaly, no masses and bowel sounds normal  MS: no gross musculoskeletal defects noted, no edema    Diagnostic Test Results:  none         Assessment & Plan       ICD-10-CM    1. Acute sinusitis with symptoms > 10 days J01.90 Dextromethorphan-Guaifenesin  MG TB12     azithromycin (ZITHROMAX) 250 MG tablet      Zpack   Mucinex dm 1  Tablet twice a day for 10 days   Lot water   BMI:   Estimated body mass index is 27.28 kg/m  as calculated from the following:    Height as of this encounter: 1.6 m (5' 3\").    Weight as of this encounter: 69.9 kg (154 lb).   Weight management plan: Discussed healthy diet and exercise guidelines            Return in about 1 week (around 1/29/2020), or if symptoms worsen or fail to improve.    Eirca Kent PA-C  [facility]      "

## 2020-03-11 ENCOUNTER — HEALTH MAINTENANCE LETTER (OUTPATIENT)
Age: 25
End: 2020-03-11

## 2020-07-01 ENCOUNTER — NURSE TRIAGE (OUTPATIENT)
Dept: NURSING | Facility: CLINIC | Age: 25
End: 2020-07-01

## 2020-07-01 NOTE — TELEPHONE ENCOUNTER
Pt reports she was seen by an online NP who told her to get tested for COVID 19 but did not order the test for her.    Advised pt she can go to [facility] website for testing locations.     Pt verbalizes understanding and agrees to plan.     COVID 19 Nurse Triage Plan/Patient Instructions    Please be aware that novel coronavirus (COVID-19) may be circulating in the community. If you develop symptoms such as fever, cough, or SOB or if you have concerns about the presence of another infection including coronavirus (COVID-19), please contact your health care provider or visit [email or web address].     Disposition/Instructions    Additional COVID19 information to add for patients.   How can I protect others?  If you have symptoms (fever, cough, body aches or trouble breathing): Stay home and away from others (self-isolate) until:    At least 10 days have passed since your symptoms started. And     You ve had no fever--and no medicine that reduces fever--for 3 full days (72 hours). And      Your other symptoms have resolved (gotten better).     If you don t have symptoms, but a test showed that you have COVID-19 (you tested positive):    Stay home and away from others (self-isolate) until at least 10 days have passed since the date of your first positive COVID-19 test.    During this time:    Stay in your own room, even for meals. Use your own bathroom if you can.     Stay away from others in your home. No hugging, kissing or shaking hands. No visitors.    Don t go to work, school or anywhere else.     Clean  high touch  surfaces often (doorknobs, counters, handles, etc.). Use a household cleaning spray or wipes. You ll find a full list on the EPA website:  www.epa.gov/pesticide-registration/list-n-disinfectants-use-against-sars-cov-2.    Cover your mouth and nose with a mask, tissue or washcloth to avoid spreading germs.    Wash your hands and face often. Use soap and water.    Caregivers in these groups are at risk for severe  illness due to COVID-19:  o People 65 years and older  o People who live in a nursing home or long-term care facility  o People with chronic disease (lung, heart, cancer, diabetes, kidney, liver, immunologic)  o People who have a weakened immune system, including those who:  - Are in cancer treatment  - Take medicine that weakens the immune system, such as corticosteroids  - Had a bone marrow or organ transplant  - Have an immune deficiency  - Have poorly controlled HIV or AIDS  - Are obese (body mass index of 40 or higher)  - Smoke regularly    Caregivers should wear gloves while washing dishes, handling laundry and cleaning bedrooms and bathrooms.    Use caution when washing and drying laundry: Don t shake dirty laundry, and use the warmest water setting that you can.    For more tips, go to www.cdc.gov/coronavirus/2019-ncov/downloads/10Things.pdf.    How can I take care of myself?  1. Get lots of rest. Drink extra fluids (unless a doctor has told you not to).     2. Take Tylenol (acetaminophen) for fever or pain. If you have liver or kidney problems, ask your family doctor if it s okay to take Tylenol.     Adults can take either:     650 mg (two 325 mg pills) every 4 to 6 hours, or     1,000 mg (two 500 mg pills) every 8 hours as needed.     Note: Don t take more than 3,000 mg in one day.   Acetaminophen is found in many medicines (both prescribed and over-the-counter medicines). Read all labels to be sure you don t take too much.     For children, check the Tylenol bottle for the right dose. The dose is based on the child s age or weight.    3. If you have other health problems (like cancer, heart failure, an organ transplant or severe kidney disease): Call your specialty clinic if you don t feel better in the next 2 days.    4. Know when to call 911: Emergency warning signs include:    Trouble breathing or shortness of breath    Pain or pressure in the chest that doesn t go away    Feeling confused like you  haven t felt before, or not being able to wake up    Bluish-colored lips or face    What are the symptoms of COVID-19?     The most common symptoms are cough, fever and trouble breathing.     Less common symptoms include body aches, chills, diarrhea (loose, watery poops), fatigue (feeling very tired), headache, runny nose, sore throat and loss of smell.    COVID-19 can cause severe coughing (bronchitis) and lung infection (pneumonia).    How does it spread?     The virus may spread when a person coughs or sneezes into the air. The virus can travel about 6 feet this way, and it can live on surfaces.      Common  (household disinfectants) will kill the virus.    Who is at risk?  Anyone can catch COVID-19 if they re around someone who has the virus.    How can others protect themselves?     Stay away from people who have COVID-19 (or symptoms of COVID-19).    Wash hands often with soap and water. Or, use hand  with at least 60% alcohol.    Avoid touching the eyes, nose or mouth.     Wear a face mask when you go out in public, when sick or when caring for a sick person.    Where can I get more information?    [facility]: About COVID-19: [email or web address]    CDC: What to Do If You re Sick: www.cdc.gov/coronavirus/2019-ncov/about/steps-when-sick.html    CDC: Ending Home Isolation: www.cdc.gov/coronavirus/2019-ncov/hcp/disposition-in-home-patients.html     CDC: Caring for Someone: www.cdc.gov/coronavirus/2019-ncov/if-you-are-sick/care-for-someone.html     [facility]: Interim Guidance for Hospital Discharge to Home: www.health.[facility].[place]/diseases/coronavirus/hcp/hospdischarge.pdf    [facility] clinical trials (COVID-19 research studies): clinicalaffairs.[facility]/umn-clinical-trials     Below are the COVID-19 hotlines at the [place] Department of Health ([facility]). Interpreters are available.   o For health questions: Call 568-656-1775 or 1-624.440.7988 (7 a.m. to 7 p.m.)  o For  questions about schools and childcare: Call 220-421-7589 or 1-310.930.6405 (7 a.m. to 7 p.m.)          Thank you for taking steps to prevent the spread of this virus.  o Limit your contact with others.  o Wear a simple mask to cover your cough.  o Wash your hands well and often.    Resources    [facility]: About COVID-19: [email or web address]    CDC: What to Do If You're Sick: www.cdc.gov/coronavirus/2019-ncov/about/steps-when-sick.html    CDC: Ending Home Isolation: www.cdc.gov/coronavirus/2019-ncov/hcp/disposition-in-home-patients.html     CDC: Caring for Someone: www.cdc.gov/coronavirus/2019-ncov/if-you-are-sick/care-for-someone.html     [facility]: Interim Guidance for Hospital Discharge to Home: www.health.[facility].[place]./diseases/coronavirus/hcp/hospdischarge.pdf    [facility] clinical trials (COVID-19 research studies): clinicalaffairs.[facility]-clinical-trials     Below are the COVID-19 hotlines at the [place] Department of [facility] ([facility]). Interpreters are available.   o For health questions: Call 133-165-2390 or 1-488.258.7185 (7 a.m. to 7 p.m.)  o For questions about schools and childcare: Call 193-791-7854 or 1-278.459.2359 (7 a.m. to 7 p.m.)                       Reason for Disposition    COVID-19 Testing, questions about    Protocols used: CORONAVIRUS (COVID-19) DIAGNOSED OR ALIKSVXOX-U-[place] 5.16.20

## 2021-01-03 ENCOUNTER — HEALTH MAINTENANCE LETTER (OUTPATIENT)
Age: 26
End: 2021-01-03

## 2021-04-25 ENCOUNTER — HEALTH MAINTENANCE LETTER (OUTPATIENT)
Age: 26
End: 2021-04-25

## 2021-10-10 ENCOUNTER — HEALTH MAINTENANCE LETTER (OUTPATIENT)
Age: 26
End: 2021-10-10

## 2022-05-22 ENCOUNTER — HEALTH MAINTENANCE LETTER (OUTPATIENT)
Age: 27
End: 2022-05-22

## 2022-09-18 ENCOUNTER — HEALTH MAINTENANCE LETTER (OUTPATIENT)
Age: 27
End: 2022-09-18

## 2023-07-30 ENCOUNTER — HEALTH MAINTENANCE LETTER (OUTPATIENT)
Age: 28
End: 2023-07-30